# Patient Record
Sex: FEMALE | Race: ASIAN | NOT HISPANIC OR LATINO | URBAN - METROPOLITAN AREA
[De-identification: names, ages, dates, MRNs, and addresses within clinical notes are randomized per-mention and may not be internally consistent; named-entity substitution may affect disease eponyms.]

---

## 2019-03-11 ENCOUNTER — EMERGENCY (EMERGENCY)
Facility: HOSPITAL | Age: 54
LOS: 0 days | Discharge: HOME | End: 2019-03-11
Attending: EMERGENCY MEDICINE | Admitting: EMERGENCY MEDICINE
Payer: COMMERCIAL

## 2019-03-11 VITALS
SYSTOLIC BLOOD PRESSURE: 195 MMHG | HEIGHT: 62 IN | HEART RATE: 86 BPM | DIASTOLIC BLOOD PRESSURE: 95 MMHG | WEIGHT: 205.91 LBS | OXYGEN SATURATION: 95 % | RESPIRATION RATE: 18 BRPM | TEMPERATURE: 98 F

## 2019-03-11 VITALS
RESPIRATION RATE: 18 BRPM | OXYGEN SATURATION: 97 % | SYSTOLIC BLOOD PRESSURE: 146 MMHG | HEART RATE: 86 BPM | DIASTOLIC BLOOD PRESSURE: 78 MMHG | TEMPERATURE: 98 F

## 2019-03-11 DIAGNOSIS — R06.00 DYSPNEA, UNSPECIFIED: ICD-10-CM

## 2019-03-11 DIAGNOSIS — R06.02 SHORTNESS OF BREATH: ICD-10-CM

## 2019-03-11 DIAGNOSIS — E11.9 TYPE 2 DIABETES MELLITUS WITHOUT COMPLICATIONS: ICD-10-CM

## 2019-03-11 PROCEDURE — 99283 EMERGENCY DEPT VISIT LOW MDM: CPT

## 2019-03-11 PROCEDURE — 93010 ELECTROCARDIOGRAM REPORT: CPT

## 2019-03-11 RX ORDER — IPRATROPIUM/ALBUTEROL SULFATE 18-103MCG
3 AEROSOL WITH ADAPTER (GRAM) INHALATION ONCE
Qty: 0 | Refills: 0 | Status: COMPLETED | OUTPATIENT
Start: 2019-03-11 | End: 2019-03-11

## 2019-03-11 RX ADMIN — Medication 3 MILLILITER(S): at 22:53

## 2019-03-11 NOTE — ED PROVIDER NOTE - CLINICAL SUMMARY MEDICAL DECISION MAKING FREE TEXT BOX
Plan for duo nebs and reassess. Pt likely has supplemented admitted upper airway irritation. Plan for duo nebs and reassess. Pt likely has self limited upper airway irritation.

## 2019-03-11 NOTE — ED PROVIDER NOTE - MUSCULOSKELETAL, MLM
Spine appears normal, range of motion is not limited, no muscle or joint tenderness. No swelling of neck.

## 2019-03-11 NOTE — ED ADULT NURSE NOTE - OBJECTIVE STATEMENT
patient resting comfortably in stretcher in no acute distress. A&Ox3. states she was cleaning her bathtub with bleach and started having difficulty breathing. beginning to feel better now. VSS. color good. skin warm and dry. respirations even and unlabored.

## 2019-03-11 NOTE — ED PROVIDER NOTE - OBJECTIVE STATEMENT
54 y/o female with a PMHx of DM presents to the ED c/o difficulty breathing. Pt reports she was cleaning around 20:30 today with a mix of bleach while in an enclosed room. Pt then began having difficulty breathing and throat tightness about 10 minutes later. Now with improved symptoms in ED. Non smoker. NKDA.

## 2019-10-24 NOTE — ED ADULT TRIAGE NOTE - ADDITIONAL SAFETY/BANDS...
Dr. Montalvo's Pre-Surgical Instructions    These instructions are for your safety.  Failure to follow these instructions may result in your surgery being delayed, postponed or cancelled.    1. If you are taking the following blood thinner medications they should be stopped, unless otherwise directed by Cardiologist, Coumadin clinic, or Primary Care Physician or Prescribing MD:      A:  Brilinta/Ticagrelor -STOP taking 7 days prior to surgery        Plavix/Clopidigrel -STOP taking 7 days prior to surgery  (PLEASE CHECK WITH PRESCRIBING MD IF YOU ARE ABLE TO STOP TAKING THIS MEDICATION).   B:  Pradaxa (dabigatrane.m) -STOP taking 3 days prior to surgery          Eliquis (apixaban) -STOP taking 3 days prior to surgery        Xarelto (rivaroxaban) -STOP taking 3 days prior to surgery        Savaysa (edoxaban) -STOP taking 3 days prior to surgery      C:  Coumadin/Warfarin  -STOP taking 4 days prior to surgery  (UNLESS DIRECTED OTHERWISE)    2.  If you are taking the medication Phentermine (a diet aid) you must STOP taking it 14 days before surgery.     3.  Aspirin, does NOT have to be stopped, unless directed otherwise by a Medical Doctor.    4.  Your Anesthesiologist (the doctor that controls your sedation during your procedure) and/or a Nurse will call you and give you instructions regarding the day of surgery (usually 1-2 days before surgery).    5.  Do NOT eat or drink anything after midnight the night before your surgery, including water, gum, food, candy, coffee, tea or soda.      6.  Please leave all jewelry and valuables at home.  You will be asked for photo ID and insurance cards at check-in.    7.  Make arrangements for someone to pick you up from the hospital.  You will NOT be allowed to take a bus, van or cab home alone.  You are responsible for arranging and setting up your own rides.    8.  If you are having sedation/anesthesia, you will need someone you know to accompany you home in the cab, bus, car or  van.  You should have someone stay with you overnight.  You CANNOT drive yourself home.  You CANNOT go home with just the van/ alone.    9.  Wear loose, comfortable clothing, nothing restrictive to surgical arm.  You will be asked to change out of all your street clothes and wear a hospital gown.    10.  Please bathe (sponge, shower or tub) with an antibacterial soap (Dial, Chlorhexidine, Hibiclens) the night before and the morning of surgery.    11.  You will be called several times by the [facility]Day [facility] (Registration and Nurse Pre-Op call) a few days prior to surgery.  The Nurse will inform you of the correct time you should arrive.  The Nurse will also review your ness history and will need to know your current medication list.      12.  If there is any reason you cannot keep your surgery appointment, need to reschedule or have any questions or concerns, please call and/or leave a message at [phone].      13.  If you need to cancel surgery AND it is the day of your procedure, or it is after business hours/on a weekend/holiday, please call and/or leave a message at [phone].      14.  [facility] Day Surgery department is located on the Southwest corner of the [facility].  The sign above the door read \"Ambulatory/Day Surgery Center.\"  Please enter there, the \"check in\" desk is just inside this entrance.  If you have any questions or concerns, please contact the office at [phone].        Thank you,     Dr. Jf Montalvo  Vascular Surgery  [facility]  Ambulatory/Day Surgery West side of the building  [address] [place] [id number]          Surgery Date:  11/15/2019  Surgery Time:  10:00 AM  Arrival Time:  8:00 AM         Additional Safety/Bands:

## 2022-06-06 NOTE — ED ADULT TRIAGE NOTE - ARRIVAL FROM
[facility]     HISTORY AND PHYSICAL EXAMINATION            Date:   6/6/2022  Patientname:  Jamal Núñez  Date of admission:  6/5/2022  1:47 PM  MRN:   [id number]  Account:  [de-identified]  YOB: 1952  PCP:    Suki Stephens MD  Room:   2064/2064-01  Code Status:    Full Code    CHIEF COMPLAINT     Chief Complaint   Patient presents with    Leg Swelling       HISTORY OF PRESENT ILLNESS  (Character, Onset, Location, Duration,  Exacerbating/RelievingFactors, Radiation,   Associated Symptoms, Severity )      The patient is a 69 y.o.  female, with a history of anemia, spondylolisthesis, DVT, chronic diastolic heart failure, CVA, C. difficile, HTN, CKD stage III, peptic ulcer disease, and DM type II, who presents with leg swelling. According to patient and her , legs have been increasingly edematous with progressive erythema over the past 2 weeks. Patient was admitted to this facility in March for frequent falls and found to have DVT. Patient reports that she was concerned for recurrent DVT, despite being compliant with Eliquis. Following inpatient treatment, patient received intensive therapy and acute rehab department but reports having 5 falls since that time. Patient has a history of left sided weakness from old CVA.  thinks falls are due to poor technique with transfers d/t fear of falling, plus edema makes it difficult to lift legs to walk. Symptoms are associated with painful hematoma on posterior scalp from recent fall. Patient also reports dry non-productive cough, which is residual from recent bronchitis. Denies fever, chills, chest pain, abdominal pain, nausea, vomiting, diarrhea, and urinary symptoms. There are no aggravating or alleviating factors. Symptoms are reported as constant and moderate to severe; progressively worsening.       PAST MEDICAL HISTORY   Patient  has a past medical history of Allergic rhinitis, cause unspecified, Back pain, Bowel obstruction (HCC), C. difficile diarrhea, CAD (coronary artery disease), Cardiac murmur, Cellulitis, Cellulitis, Cerebral artery occlusion with cerebral infarction ([address] COVID-19, Diverticulosis of colon (without mention of hemorrhage), GERD (gastroesophageal reflux disease), GERD (gastroesophageal reflux disease), History of blood transfusion, History of CHF (congestive heart failure), History of MI (myocardial infarction), History of ovarian cyst, History of peritonitis, HTN (hypertension), Hx of blood clots, Hyperlipidemia, Intestinal or peritoneal adhesions with obstruction (postoperative) (postinfection) ([address] Kidney infection, Lateral epicondylitis  of elbow, MDRO (multiple drug resistant organisms) resistance, Muscle strain, Other abnormal glucose, PONV (postoperative nausea and vomiting), Pre-diabetes, Restless legs syndrome (RLS), Snores, Stenosis of cervical spine with myelopathy ([address] TIA (transient ischemic attack), Uses walker, Vitamin D deficiency, Wears glasses, and Wellness examination. PAST SURGICAL HISTORY    Patient  has a past surgical history that includes Ovary removal (1970); colectomy (1969); Appendectomy (1968); Ovary removal (1971); Hysterectomy (1973); Bunionectomy (Left); sinus surgery (2004); Colonoscopy; other surgical history (08/14/2014); cyst removal (Right); Wrist fracture surgery (Left, 03/05/2019); Upper gastrointestinal endoscopy (N/A, 05/31/2019); Upper gastrointestinal endoscopy (N/A, 08/05/2019); Upper gastrointestinal endoscopy (N/A, 08/23/2019); Abdomen surgery (1976); lumbar fusion (N/A, 02/10/2020); Cardiac catheterization; Cardiac catheterization (2005); [place] tooth extraction; lumbar fusion (N/A, 06/17/2020); back surgery; cervical fusion (05/21/2021); and cervical fusion (N/A, 5/21/2021).      FAMILY HISTORY    Patient family history includes Diabetes in her mother; Heart Disease in her brother, father, maternal grandmother, and mother; High Blood Pressure in her brother, mother, and sister; Stroke in her mother. SOCIAL HISTORY    Patient  reports that she quit smoking about 4 years ago. Her smoking use included cigarettes. She started smoking about 26 years ago. She has a 10.00 pack-year smoking history. She has never used smokeless tobacco. She reports that she does not drink alcohol and does not use drugs. HOME MEDICATIONS        Prior to Admission medications    Medication Sig Start Date End Date Taking? Authorizing Provider   doxycycline hyclate (VIBRA-TABS) 100 MG tablet Take 1 tablet by mouth 2 times daily for 7 days 6/5/22 6/12/22 Yes Tim Ramsey MD   fenofibrate micronized (LOFIBRA) 134 MG capsule Take 1 capsule by mouth every morning (before breakfast) 5/23/22  Yes Leela Archuleta MD   apixaban (ELIQUIS) 5 MG TABS tablet Take 1 tablet by mouth 2 times daily 5/2/22  Yes Leela Archuleta MD   busPIRone (BUSPAR) 5 MG tablet Take 1 tablet by mouth 3 times daily 5/2/22  Yes Leeal Archuleta MD   gabapentin (NEURONTIN) 100 MG capsule Take 2 capsules by mouth 2 times daily for 30 days.  5/2/22 6/5/22 Yes Leela Archuleta MD   furosemide (LASIX) 20 MG tablet Take 1 tablet by mouth daily 4/11/22  Yes Berndaette Saravia MD   citalopram (CELEXA) 20 MG tablet Take 1 tablet by mouth daily 4/10/22  Yes Demi Laboy MD   atorvastatin (LIPITOR) 40 MG tablet Take 1 tablet by mouth nightly 4/10/22  Yes Demi Laboy MD   pramipexole (MIRAPEX) 0.5 MG tablet Take 1 tablet by mouth nightly 4/10/22  Yes Demi Laboy MD   carvedilol (COREG) 12.5 MG tablet Take 1 tablet by mouth 2 times daily 4/10/22  Yes Demi Laboy MD   amLODIPine (NORVASC) 5 MG tablet Take 1 tablet by mouth daily 4/10/22  Yes Demi Laboy MD   melatonin 3 MG TABS tablet Take 2 tablets by mouth nightly as needed (insomnia) 4/7/22  Yes Demi Laboy MD   cyanocobalamin ([employer] VITAMIN B12) 1000 MCG tablet Take 1 tablet by mouth daily  Patient taking differently: Take 1,000 mcg by mouth at bedtime  12/3/20  Yes Ethan Arnold MD   ferrous sulfate (IRON 325) 325 (65 Fe) MG tablet Take 1 tablet by mouth 2 times daily 7/2/20  Yes Dottie Odom MD   VITAMIN D, ERGOCALCIFEROL, PO Take by mouth nightly    Yes Historical Provider, MD   Calcium Carbonate-Vitamin D (CALCIUM 500/D) 500-125 MG-UNIT TABS Take 1 tablet by mouth nightly    Yes Historical Provider, MD   albuterol-ipratropium (COMBIVENT RESPIMAT)  MCG/ACT AERS inhaler Inhale 1 puff into the lungs every 6 hours as needed for Wheezing or Shortness of Breath 4/10/22   Evie Pollock MD   losartan (COZAAR) 100 MG tablet Take 1 tablet by mouth daily 4/10/22   Evie Pollock MD   acetaminophen (TYLENOL) 325 MG tablet Take 2 tablets by mouth every 4 hours as needed for Pain 4/7/22   Evie Pollock MD   nitroGLYCERIN (NITROSTAT) 0.4 MG SL tablet Place 1 tablet under the tongue every 5 minutes as needed for Chest pain 9/1/21   Ethan Arnold MD   Lancets MISC 1 each by Does not apply route daily 10/10/19   Amada Rivers MD   blood glucose monitor strips Test 2 times a day & as needed for symptoms of irregular blood glucose. 10/10/19   Amada Rivers MD       ALLERGIES      Bactrim [sulfamethoxazole-trimethoprim], Codeine, Diazepam, Meperidine hcl, and Seasonal    REVIEW OF SYSTEMS     Review of Systems   Constitutional: Negative for chills, diaphoresis and fever. HENT: Negative for congestion and sore throat. Respiratory: Positive for cough (dry, nonproductive). Negative for shortness of breath and wheezing. Cardiovascular: Positive for leg swelling (reports weeping edema). Negative for chest pain and palpitations. Gastrointestinal: Negative for abdominal pain, constipation, diarrhea, nausea and vomiting. Genitourinary: Negative for dysuria, frequency and urgency. Musculoskeletal: Negative for back pain and myalgias. Skin: Positive for color change (erythema, BLE). Negative for rash.    Neurological: Positive for weakness (generalized weakness) and headaches (pain on posterior scalp from recent fall. ). Negative for dizziness. Psychiatric/Behavioral: The patient is not nervous/anxious. PHYSICAL EXAM      BP (!) 154/68   Pulse 72   Temp 97.8 °F (36.6 °C) (Oral)   Resp 18   Ht 5' 3\" (1.6 m)   Wt 180 lb (81.6 kg)   SpO2 94%   BMI 31.89 kg/m²  Body mass index is 31.89 kg/m². Physical Exam  Constitutional:       General: She is not in acute distress. Appearance: She is well-developed. She is not diaphoretic. HENT:      Head: Normocephalic and atraumatic. Eyes:      Conjunctiva/sclera: Conjunctivae normal.      Pupils: Pupils are equal, round, and reactive to light. Neck:      Trachea: No tracheal deviation. Cardiovascular:      Rate and Rhythm: Normal rate and regular rhythm. Heart sounds: Normal heart sounds. No murmur heard. No friction rub. No gallop. Pulmonary:      Effort: Pulmonary effort is normal. No respiratory distress. Breath sounds: Normal breath sounds. No wheezing or rales. Chest:      Chest wall: No tenderness. Abdominal:      General: Bowel sounds are normal. There is no distension. Palpations: Abdomen is soft. Tenderness: There is no abdominal tenderness. There is no guarding. Musculoskeletal:         General: No tenderness. Normal range of motion. Cervical back: Normal range of motion and neck supple. Right lower leg: Edema (3+) present. Left lower leg: Edema (3+) present. Comments: Patient with 3+ pitting edema bilateral lower extremities that extends up your groin. Bilateral lower legs are mildly erythematous with increased warmth. There are multiple small scabbed areas on bilateral shins. Lymphadenopathy:      Cervical: No cervical adenopathy. Skin:     General: Skin is warm and dry. Coloration: Skin is not pale. Findings: Erythema (BLE) present. No rash.    Neurological:      Mental Status: She is alert and oriented to person, place, and time. Motor: No seizure activity. Coordination: Coordination normal.   Psychiatric:         Behavior: Behavior normal.         Thought Content: Thought content normal.       DIAGNOSTICS      EKG:   EKG 12 Lead [[id number]]    Collected: 06/05/22 1514    Updated: 06/05/22 1515     Ventricular Rate 74 BPM    Atrial Rate 74 BPM    P-R Interval 166 ms    QRS Duration 84 ms    Q-T Interval 400 ms    QTc Calculation (Bazett) 444 ms    P Axis 64 degrees    R Axis 45 degrees    T Axis 47 degrees   Narrative:     Normal sinus rhythm   Normal ECG   When compared with ECG of 29-MAR-2022 09:30,   No significant change was found     Labs:  CBC:   Recent Labs     06/05/22  1410   WBC 6.6   HGB 11.6*        BMP:    Recent Labs     06/05/22  1515      K 4.2      CO2 22   BUN 27*   CREATININE 0.91*   GLUCOSE 109*     S. Calcium:  Recent Labs     06/05/22  1515   CALCIUM 9.2     S. Ionized Calcium:No results for input(s): IONCA in the last 72 hours. S. Magnesium:No results for input(s): MG in the last 72 hours. S. Phosphorus:No results for input(s): PHOS in the last 72 hours. S. Glucose:No results for input(s): POCGLU in the last 72 hours. Glycosylated hemoglobin A1C:   Lab Results   Component Value Date    LABA1C 5.1 02/01/2022     Hepatic:   Recent Labs     06/05/22  1515   AST 24   ALT 17   ALKPHOS 47     CARDIAC ENZY:   Recent Labs     06/05/22  1515   TROPHS 23*     INR: No results for input(s): INR in the last 72 hours. BNP:   Recent Labs     06/05/22  1515   PROBNP 171      ABGs: No results for input(s): PH, PCO2, PO2, HCO3, O2SAT in the last 72 hours. Lipids: No results for input(s): CHOL, TRIG, HDL, LDL, LDLCALC in the last 72 hours. Pancreatic functions:No results for input(s): LIPASE, AMYLASE in the last 72 hours. Tomma Leisenring: No results for input(s): LACTA in the last 72 hours.   Thyroid functions:   Lab Results   Component Value Date    TSH 1.43 05/20/2019      U/A:  Recent Labs     06/05/22  1540   COLORU Yellow   WBCUA 0 TO 2   RBCUA 0 TO 2   BACTERIA FEW*   SPECGRAV 1.011   LEUKOCYTESUR NEGATIVE   GLUCOSEU NEGATIVE     No results for input(s): COVID19 in the last 72 hours. Imaging/Diagonstics:     CT Head WO Contrast    Result Date: 6/5/2022  EXAMINATION: CT OF THE HEAD WITHOUT CONTRAST  6/5/2022 2:31 pm TECHNIQUE: CT of the head was performed without the administration of intravenous contrast. Automated exposure control, iterative reconstruction, and/or weight based adjustment of the mA/kV was utilized to reduce the radiation dose to as low as reasonably achievable. COMPARISON: None. HISTORY: ORDERING SYSTEM PROVIDED HISTORY: fall, head injury TECHNOLOGIST PROVIDED HISTORY: fall, head injury Decision Support Exception - unselect if not a suspected or confirmed emergency medical condition->Emergency Medical Condition (MA) Reason for Exam: fall, head injury Additional signs and symptoms: Pt states fall with head injury a few days ago FINDINGS: BRAIN/VENTRICLES: There is no acute intracranial hemorrhage, mass effect or midline shift. No abnormal extra-axial fluid collection. The gray-white differentiation is maintained without evidence of an acute infarct. There is no evidence of hydrocephalus. ORBITS: The visualized portion of the orbits demonstrate no acute abnormality. SINUSES: The visualized paranasal sinuses and mastoid air cells demonstrate no acute abnormality. Probable calcified osteoid osteoma noted in the right maxillary sinus measuring 1.5 x 1 cm. SOFT TISSUES/SKULL:  No acute abnormality of the visualized skull or soft tissues. No acute intracranial abnormality.      CT Cervical Spine WO Contrast    Result Date: 6/5/2022  EXAMINATION: CT OF THE CERVICAL SPINE WITHOUT CONTRAST 6/5/2022 11:33 am TECHNIQUE: CT of the cervical spine was performed without the administration of intravenous contrast. Multiplanar reformatted images are provided for review. Automated exposure control, iterative reconstruction, and/or weight based adjustment of the mA/kV was utilized to reduce the radiation dose to as low as reasonably achievable. COMPARISON: 03/16/2022 HISTORY: ORDERING SYSTEM PROVIDED HISTORY: fall, neck pain TECHNOLOGIST PROVIDED HISTORY: fall, neck pain Decision Support Exception - unselect if not a suspected or confirmed emergency medical condition->Emergency Medical Condition (MA) Reason for Exam: fall, head injury Additional signs and symptoms: Pt states fall a few days ago and complains of neck pain FINDINGS: BONES/ALIGNMENT: There is stable Z2-G6 posterior metallic fusion with associated laminectomies at this level. No evidence of instrumentation loosening or failure. Stable reversal of the normal cervical lordosis. No fracture or osseous destructive lesion. DEGENERATIVE CHANGES: Multilevel degenerative disc disease is noted in the cervical spine which is mild in severity. This is greatest at C6-C7. SOFT TISSUES: Vascular calcifications are noted along the aorta. The lung apices are clear. The thyroid gland is heterogeneous. There is a right-sided thyroid nodule that is low in attenuation measuring 4 mm, benign. No follow-up imaging is required. Vascular calcifications are noted in the carotid bulbs. No acute osseous abnormality of the cervical spine. No significant change from prior exam     XR CHEST PORTABLE    Result Date: 6/5/2022  EXAMINATION: ONE XRAY VIEW OF THE CHEST 6/5/2022 2:15 pm COMPARISON: April 8, 2022 HISTORY: ORDERING SYSTEM PROVIDED HISTORY: shortness of breath TECHNOLOGIST PROVIDED HISTORY: shortness of breath Reason for Exam: Shortness of breath FINDINGS: The lungs are without acute focal process. There is no effusion or pneumothorax. The cardiomediastinal silhouette is without acute process. The osseous structures are without acute process. No acute process.      XR CERVICAL SPINE FLEXION AND EXTENSION    Result Date: 5/10/2022  EXAMINATION: 2 XRAY VIEWS OF THE CERVICAL SPINE 5/10/2022 10:29 am COMPARISON: None. HISTORY: ORDERING SYSTEM PROVIDED HISTORY: Ataxia TECHNOLOGIST PROVIDED HISTORY: eval for stability at C6-7 FINDINGS: Thoracic kyphosis with straightening of the cervical spine and hyperflexion. Posterior interbody fusion rods and pedicular screws C3 through C6. Slight anterior subluxation C6-7. Moderate spondylosis. Hardware appears intact. Flexion-extension views demonstrate limited motion and no evidence of ena instability. Moderate spondylosis and disc space narrowing at C5-6. No prevertebral swelling. Posterior interbody fusion C3 through C6. Slight anterior subluxation C6-7. No ena instability. ASSESSMENT  and  PLAN     Principal Problem:    Cellulitis of both lower extremities  Active Problems:    Stage 3 chronic kidney disease (HCC)    Type 2 diabetes mellitus with circulatory disorder, without long-term current use of insulin (HCC)  Resolved Problems:    * No resolved hospital problems.  *    Plan:    Cellulitis of bilateral lower legs  -IV Vancomycin initiated in ED  --Continue on admission to unit  --Pharmacy to dose  -Elevate affected limbs  -Change Lasix to IV for now    Generalized weakness  -multiple falls over the past few weeks  --reports painful bump on posterior scalp  -CT head - no acute intercranial abnormality  -CT cervical spine - no acute osseous abnormality   --no significant change from prior exam  -PT and OT eval and treat  -Fall precautions  -Social Service Consult for discharge planning  --May need rehab before returning home    CKD  -Patient with history of CKD Stage 3  -Creatinine: 0.91  ---Baseline: near 1.2    Chronic Anticoagulation  Patient anticoagulated d/t recent DVT  -Continue home dose Eliquis  -monitor for signs of bleeding    Consultations:     Arturo Reyes [phone], APRN - CNP   6/6/2022 4:28 AM    [facility] [address]  [place], [address] [place] [address]. Phone  and add on       I have discussed the care of Imelda Parks , including pertinent history and exam findings,      6/6/22    with the resident. I have seen and examined the patient and the key elements of all parts of the encounter have been performed by me . I agree with the assessment, plan and orders as documented by the resident. Hospital Problems           Last Modified POA    * (Principal) Cellulitis of both lower extremities 6/5/2022 Yes    Stage 3 chronic kidney disease ([facility] 6/6/2022 Yes    Type 2 diabetes mellitus with circulatory disorder, without long-term current use of insulin ([facility] 6/6/2022 Yes             ''''''''''       MD [facility]  [place], [address] [place] [address].    Phone [phone]   Fax: [phone]  Answering Service: [phone] Home